# Patient Record
(demographics unavailable — no encounter records)

---

## 2025-07-23 NOTE — PHYSICAL EXAM
[No Acute Distress] : no acute distress [Well Nourished] : well nourished [Well Developed] : well developed [Well-Appearing] : well-appearing [Normal Voice/Communication] : normal voice/communication [Normal Sclera/Conjunctiva] : normal sclera/conjunctiva [PERRL] : pupils equal round and reactive to light [EOMI] : extraocular movements intact [Fundoscopic Exam Performed] : fundoscopic ~T exam ~C was performed [Normal Outer Ear/Nose] : the outer ears and nose were normal in appearance [Normal Oropharynx] : the oropharynx was normal [Normal TMs] : both tympanic membranes were normal [Normal Nasal Mucosa] : the nasal mucosa was normal [No JVD] : no jugular venous distention [No Lymphadenopathy] : no lymphadenopathy [Supple] : supple [Thyroid Normal, No Nodules] : the thyroid was normal and there were no nodules present [No Respiratory Distress] : no respiratory distress  [No Accessory Muscle Use] : no accessory muscle use [Clear to Auscultation] : lungs were clear to auscultation bilaterally [Normal Rate] : normal rate  [Regular Rhythm] : with a regular rhythm [Normal S1, S2] : normal S1 and S2 [No Murmur] : no murmur heard [No Carotid Bruits] : no carotid bruits [No Abdominal Bruit] : a ~M bruit was not heard ~T in the abdomen [No Varicosities] : no varicosities [Pedal Pulses Present] : the pedal pulses are present [No Edema] : there was no peripheral edema [No Palpable Aorta] : no palpable aorta [No Extremity Clubbing/Cyanosis] : no extremity clubbing/cyanosis [Soft] : abdomen soft [Non Tender] : non-tender [Non-distended] : non-distended [No Masses] : no abdominal mass palpated [No HSM] : no HSM [Normal Bowel Sounds] : normal bowel sounds [Normal Posterior Cervical Nodes] : no posterior cervical lymphadenopathy [Normal Anterior Cervical Nodes] : no anterior cervical lymphadenopathy [No CVA Tenderness] : no CVA  tenderness [No Spinal Tenderness] : no spinal tenderness [No Joint Swelling] : no joint swelling [Grossly Normal Strength/Tone] : grossly normal strength/tone [No Rash] : no rash [Coordination Grossly Intact] : coordination grossly intact [No Focal Deficits] : no focal deficits [Normal Gait] : normal gait [Deep Tendon Reflexes (DTR)] : deep tendon reflexes were 2+ and symmetric [Speech Grossly Normal] : speech grossly normal [Normal Affect] : the affect was normal [Alert and Oriented x3] : oriented to person, place, and time [Normal Mood] : the mood was normal [Normal Insight/Judgement] : insight and judgment were intact

## 2025-07-25 NOTE — HISTORY OF PRESENT ILLNESS
[de-identified] : 57 yo male with no sig PMH here for CPE. No past medical history or surgical history. 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   No cigs, alcohol or drugs. . No med.  Had mild LFT elevations in the past that improved after d/cing daily multivitamin. Had negative Hep C and iron studies. Allergies: PCN - SOB, and Hives   Doing well.  Had COVID April 2020 and vaccine X 2.  Exercising- running cycling. Does not take flu vaccine but interested in pneumovax.

## 2025-07-25 NOTE — HEALTH RISK ASSESSMENT
[Excellent] : ~his/her~  mood as  excellent [No] : In the past 12 months have you used drugs other than those required for medical reasons? No [No falls in past year] : Patient reported no falls in the past year [Little interest or pleasure doing things] : 1) Little interest or pleasure doing things [Feeling down, depressed, or hopeless] : 2) Feeling down, depressed, or hopeless [0] : 2) Feeling down, depressed, or hopeless: Not at all (0) [PHQ-2 Negative - No further assessment needed] : PHQ-2 Negative - No further assessment needed [[facility]] : 2 [Never] : Never [No Retinopathy] : No retinopathy [Patient declined colonoscopy] : Patient declined colonoscopy [Hepatitis C test offered] : Hepatitis C test offered [None] : None [With Family] : lives with family [Employed] : employed [College] : College [] :  [Sexually Active] : sexually active [Feels Safe at Home] : Feels safe at home [Fully functional (bathing, dressing, toileting, transferring, walking, feeding)] : Fully functional (bathing, dressing, toileting, transferring, walking, feeding) [Fully functional (using the telephone, shopping, preparing meals, housekeeping, doing laundry, using] : Fully functional and needs no help or supervision to perform IADLs (using the telephone, shopping, preparing meals, housekeeping, doing laundry, using transportation, managing medications and managing finances) [Reports normal functional visual acuity (ie: able to read med bottle)] : Reports normal functional visual acuity [de-identified] : Gym; walking; cycling [de-identified] : low fat [[id number]] : 0 [LowDoseCTScan] : NA [Change in mental status noted] : No change in mental status noted [Language] : denies difficulty with language [Behavior] : denies difficulty with behavior [Learning/Retaining New Information] : denies difficulty learning/retaining new information [Handling Complex Tasks] : denies difficulty handling complex tasks [Reasoning] : denies difficulty with reasoning [Spatial Ability and Orientation] : denies difficulty with spatial ability and orientation [High Risk Behavior] : no high risk behavior [Reports changes in hearing] : Reports no changes in hearing [Reports changes in vision] : Reports no changes in vision [Reports changes in dental health] : Reports no changes in dental health [MammogramDate] : NA [PapSmearDate] : NA [BoneDensityDate] : NA [ColonoscopyComments] : FIT test negative last year [HepatitisCComments] : negative 1/2022

## 2025-07-25 NOTE — HEALTH RISK ASSESSMENT
[Excellent] : ~his/her~  mood as  excellent [No] : In the past 12 months have you used drugs other than those required for medical reasons? No [No falls in past year] : Patient reported no falls in the past year [Little interest or pleasure doing things] : 1) Little interest or pleasure doing things [Feeling down, depressed, or hopeless] : 2) Feeling down, depressed, or hopeless [0] : 2) Feeling down, depressed, or hopeless: Not at all (0) [PHQ-2 Negative - No further assessment needed] : PHQ-2 Negative - No further assessment needed [[facility]] : 2 [Never] : Never [No Retinopathy] : No retinopathy [Patient declined colonoscopy] : Patient declined colonoscopy [Hepatitis C test offered] : Hepatitis C test offered [None] : None [With Family] : lives with family [Employed] : employed [College] : College [] :  [Sexually Active] : sexually active [Feels Safe at Home] : Feels safe at home [Fully functional (bathing, dressing, toileting, transferring, walking, feeding)] : Fully functional (bathing, dressing, toileting, transferring, walking, feeding) [Fully functional (using the telephone, shopping, preparing meals, housekeeping, doing laundry, using] : Fully functional and needs no help or supervision to perform IADLs (using the telephone, shopping, preparing meals, housekeeping, doing laundry, using transportation, managing medications and managing finances) [Reports normal functional visual acuity (ie: able to read med bottle)] : Reports normal functional visual acuity [de-identified] : Gym; walking; cycling [de-identified] : low fat [[id number]] : 0 [LowDoseCTScan] : NA [Change in mental status noted] : No change in mental status noted [Language] : denies difficulty with language [Behavior] : denies difficulty with behavior [Learning/Retaining New Information] : denies difficulty learning/retaining new information [Handling Complex Tasks] : denies difficulty handling complex tasks [Reasoning] : denies difficulty with reasoning [Spatial Ability and Orientation] : denies difficulty with spatial ability and orientation [High Risk Behavior] : no high risk behavior [Reports changes in hearing] : Reports no changes in hearing [Reports changes in vision] : Reports no changes in vision [Reports changes in dental health] : Reports no changes in dental health [MammogramDate] : NA [PapSmearDate] : NA [BoneDensityDate] : NA [ColonoscopyComments] : FIT test negative last year [HepatitisCComments] : negative 1/2022

## 2025-07-25 NOTE — ASSESSMENT
[FreeTextEntry1] : 59 yo male with no sig PMH here for CPE. No past medical history or surgical history. 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   No cigs, alcohol or drugs. . No med.  Had mild LFT elevations in the past that improved after d/cing daily multivitamin. Had negative Hep C and iron studies. Allergies: PCN - SOB, and Hives   Doing well.  Had COVID April 2020 and vaccine X 2.  Exercising- running cycling. Does not take flu vaccine but interested in pneumovax. Exam is unremarkable.  1. LFTs: improved with normal iron studies and negative for Hep C. Stopped vitamin supplements. Recheck today.  If it persiststs will need liver elastography and ultrasound. 2. Obesity: meets criteria by BMI.  Discussed lifestyle changes.  No need for meds at present.  No secondary issues such as DJD, diabetes, etc.  3. Cardiac: ECG done today with incomplete RBBB vs lead placement. .  No other cardiac risk factors. 4. Health care maintenance: check labs. Will need colonoscopy. Last year FIT test was negative. Considering colonoscopy.  Allergic to PCN. Tdap 4 years ago. PCV 20 today.  Prostate cancer screen sent.     [Vaccines Reviewed] : Immunizations reviewed today. Please see immunization details in the vaccine log within the immunization flowsheet.

## 2025-07-25 NOTE — HISTORY OF PRESENT ILLNESS
[de-identified] : 59 yo male with no sig PMH here for CPE. No past medical history or surgical history. 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   No cigs, alcohol or drugs. . No med.  Had mild LFT elevations in the past that improved after d/cing daily multivitamin. Had negative Hep C and iron studies. Allergies: PCN - SOB, and Hives   Doing well.  Had COVID April 2020 and vaccine X 2.  Exercising- running cycling. Does not take flu vaccine but interested in pneumovax.

## 2025-07-25 NOTE — HISTORY OF PRESENT ILLNESS
[de-identified] : 59 yo male with no sig PMH here for CPE. No past medical history or surgical history. 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   No cigs, alcohol or drugs. . No med.  Had mild LFT elevations in the past that improved after d/cing daily multivitamin. Had negative Hep C and iron studies. Allergies: PCN - SOB, and Hives   Doing well.  Had COVID April 2020 and vaccine X 2.  Exercising- running cycling. Does not take flu vaccine but interested in pneumovax.

## 2025-07-25 NOTE — HEALTH RISK ASSESSMENT
[Excellent] : ~his/her~  mood as  excellent [No] : In the past 12 months have you used drugs other than those required for medical reasons? No [No falls in past year] : Patient reported no falls in the past year [Little interest or pleasure doing things] : 1) Little interest or pleasure doing things [Feeling down, depressed, or hopeless] : 2) Feeling down, depressed, or hopeless [0] : 2) Feeling down, depressed, or hopeless: Not at all (0) [PHQ-2 Negative - No further assessment needed] : PHQ-2 Negative - No further assessment needed [[facility]] : 2 [Never] : Never [No Retinopathy] : No retinopathy [Patient declined colonoscopy] : Patient declined colonoscopy [Hepatitis C test offered] : Hepatitis C test offered [None] : None [With Family] : lives with family [Employed] : employed [College] : College [] :  [Sexually Active] : sexually active [Feels Safe at Home] : Feels safe at home [Fully functional (bathing, dressing, toileting, transferring, walking, feeding)] : Fully functional (bathing, dressing, toileting, transferring, walking, feeding) [Fully functional (using the telephone, shopping, preparing meals, housekeeping, doing laundry, using] : Fully functional and needs no help or supervision to perform IADLs (using the telephone, shopping, preparing meals, housekeeping, doing laundry, using transportation, managing medications and managing finances) [Reports normal functional visual acuity (ie: able to read med bottle)] : Reports normal functional visual acuity [de-identified] : Gym; walking; cycling [de-identified] : low fat [[id number]] : 0 [LowDoseCTScan] : NA [Change in mental status noted] : No change in mental status noted [Language] : denies difficulty with language [Behavior] : denies difficulty with behavior [Learning/Retaining New Information] : denies difficulty learning/retaining new information [Handling Complex Tasks] : denies difficulty handling complex tasks [Reasoning] : denies difficulty with reasoning [Spatial Ability and Orientation] : denies difficulty with spatial ability and orientation [High Risk Behavior] : no high risk behavior [Reports changes in hearing] : Reports no changes in hearing [Reports changes in vision] : Reports no changes in vision [Reports changes in dental health] : Reports no changes in dental health [MammogramDate] : NA [PapSmearDate] : NA [BoneDensityDate] : NA [ColonoscopyComments] : FIT test negative last year [HepatitisCComments] : negative 1/2022

## 2025-07-25 NOTE — ASSESSMENT
[FreeTextEntry1] : 57 yo male with no sig PMH here for CPE. No past medical history or surgical history. 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   No cigs, alcohol or drugs. . No med.  Had mild LFT elevations in the past that improved after d/cing daily multivitamin. Had negative Hep C and iron studies. Allergies: PCN - SOB, and Hives   Doing well.  Had COVID April 2020 and vaccine X 2.  Exercising- running cycling. Does not take flu vaccine but interested in pneumovax. Exam is unremarkable.  1. LFTs: improved with normal iron studies and negative for Hep C. Stopped vitamin supplements. Recheck today.  If it persiststs will need liver elastography and ultrasound. 2. Obesity: meets criteria by BMI.  Discussed lifestyle changes.  No need for meds at present.  No secondary issues such as DJD, diabetes, etc.  3. Cardiac: ECG done today with incomplete RBBB vs lead placement. .  No other cardiac risk factors. 4. Health care maintenance: check labs. Will need colonoscopy. Last year FIT test was negative. Considering colonoscopy.  Allergic to PCN. Tdap 4 years ago. PCV 20 today.  Prostate cancer screen sent.     [Vaccines Reviewed] : Immunizations reviewed today. Please see immunization details in the vaccine log within the immunization flowsheet.

## 2025-07-25 NOTE — DATA REVIEWED
[FreeTextEntry1] : 4/2023 ECG: NSR @ 64. No ST or T wave changes.  incomplete r bundle.  No comparison available.